# Patient Record
Sex: MALE | Race: WHITE | NOT HISPANIC OR LATINO | URBAN - METROPOLITAN AREA
[De-identification: names, ages, dates, MRNs, and addresses within clinical notes are randomized per-mention and may not be internally consistent; named-entity substitution may affect disease eponyms.]

---

## 2018-08-26 ENCOUNTER — EMERGENCY (EMERGENCY)
Facility: HOSPITAL | Age: 83
LOS: 1 days | Discharge: HOME | End: 2018-08-26
Attending: EMERGENCY MEDICINE
Payer: MEDICARE

## 2018-08-26 VITALS
HEART RATE: 84 BPM | OXYGEN SATURATION: 97 % | RESPIRATION RATE: 18 BRPM | SYSTOLIC BLOOD PRESSURE: 186 MMHG | DIASTOLIC BLOOD PRESSURE: 89 MMHG

## 2018-08-26 LAB
ALBUMIN SERPL ELPH-MCNC: 4.4 G/DL — SIGNIFICANT CHANGE UP (ref 3.3–5)
ALP SERPL-CCNC: 56 U/L — SIGNIFICANT CHANGE UP (ref 40–120)
ALT FLD-CCNC: 18 U/L — SIGNIFICANT CHANGE UP (ref 10–45)
ANION GAP SERPL CALC-SCNC: 16 MMOL/L — SIGNIFICANT CHANGE UP (ref 5–17)
APTT BLD: 32.9 SEC — SIGNIFICANT CHANGE UP (ref 27.5–37.4)
AST SERPL-CCNC: 23 U/L — SIGNIFICANT CHANGE UP (ref 10–40)
BASOPHILS NFR BLD AUTO: 1 % — SIGNIFICANT CHANGE UP (ref 0–2)
BILIRUB SERPL-MCNC: 0.4 MG/DL — SIGNIFICANT CHANGE UP (ref 0.2–1.2)
BLD GP AB SCN SERPL QL: NEGATIVE — SIGNIFICANT CHANGE UP
BUN SERPL-MCNC: 61 MG/DL — HIGH (ref 7–23)
CALCIUM SERPL-MCNC: 9 MG/DL — SIGNIFICANT CHANGE UP (ref 8.4–10.5)
CHLORIDE SERPL-SCNC: 101 MMOL/L — SIGNIFICANT CHANGE UP (ref 96–108)
CO2 SERPL-SCNC: 22 MMOL/L — SIGNIFICANT CHANGE UP (ref 22–31)
CREAT SERPL-MCNC: 2.63 MG/DL — HIGH (ref 0.5–1.3)
EOSINOPHIL # BLD AUTO: 0.2 K/UL — SIGNIFICANT CHANGE UP (ref 0–0.5)
EOSINOPHIL NFR BLD AUTO: 3 % — SIGNIFICANT CHANGE UP (ref 0–6)
GLUCOSE SERPL-MCNC: 93 MG/DL — SIGNIFICANT CHANGE UP (ref 70–99)
HCT VFR BLD CALC: 36 % — LOW (ref 39–50)
HGB BLD-MCNC: 12 G/DL — LOW (ref 13–17)
INR BLD: 1 RATIO — SIGNIFICANT CHANGE UP (ref 0.88–1.16)
LYMPHOCYTES # BLD AUTO: 16 % — SIGNIFICANT CHANGE UP (ref 13–44)
MCHC RBC-ENTMCNC: 33 PG — SIGNIFICANT CHANGE UP (ref 27–34)
MCHC RBC-ENTMCNC: 33.3 GM/DL — SIGNIFICANT CHANGE UP (ref 32–36)
MCV RBC AUTO: 99.2 FL — SIGNIFICANT CHANGE UP (ref 80–100)
MONOCYTES NFR BLD AUTO: 14 % — SIGNIFICANT CHANGE UP (ref 2–14)
NEUTROPHILS NFR BLD AUTO: 63 % — SIGNIFICANT CHANGE UP (ref 43–77)
PLATELET # BLD AUTO: 100 K/UL — LOW (ref 150–400)
POTASSIUM SERPL-MCNC: 5.5 MMOL/L — HIGH (ref 3.5–5.3)
POTASSIUM SERPL-SCNC: 5.5 MMOL/L — HIGH (ref 3.5–5.3)
PROT SERPL-MCNC: 7.1 G/DL — SIGNIFICANT CHANGE UP (ref 6–8.3)
PROTHROM AB SERPL-ACNC: 10.9 SEC — SIGNIFICANT CHANGE UP (ref 9.8–12.7)
RBC # BLD: 3.63 M/UL — LOW (ref 4.2–5.8)
RBC # FLD: 11.9 % — SIGNIFICANT CHANGE UP (ref 10.3–14.5)
RH IG SCN BLD-IMP: NEGATIVE — SIGNIFICANT CHANGE UP
SODIUM SERPL-SCNC: 139 MMOL/L — SIGNIFICANT CHANGE UP (ref 135–145)
WBC # BLD: 4.2 K/UL — SIGNIFICANT CHANGE UP (ref 3.8–10.5)
WBC # FLD AUTO: 4.2 K/UL — SIGNIFICANT CHANGE UP (ref 3.8–10.5)

## 2018-08-26 PROCEDURE — 70450 CT HEAD/BRAIN W/O DYE: CPT | Mod: 26

## 2018-08-26 PROCEDURE — 99284 EMERGENCY DEPT VISIT MOD MDM: CPT | Mod: 25

## 2018-08-26 RX ORDER — SODIUM CHLORIDE 9 MG/ML
1000 INJECTION INTRAMUSCULAR; INTRAVENOUS; SUBCUTANEOUS ONCE
Qty: 0 | Refills: 0 | Status: COMPLETED | OUTPATIENT
Start: 2018-08-26 | End: 2018-08-26

## 2018-08-26 RX ADMIN — SODIUM CHLORIDE 1000 MILLILITER(S): 9 INJECTION INTRAMUSCULAR; INTRAVENOUS; SUBCUTANEOUS at 23:13

## 2018-08-26 NOTE — ED ADULT NURSE NOTE - OBJECTIVE STATEMENT
pt c/o "nose bleed x 2 hrs after tripping on last step of stairs and hitting face onto wood floor. No LOC, no h/a" + active epistasis to bilat nares with clot to rt nare, pain to rt side of nose, + swelling to rt side nose pt c/o "nose bleed x 2 hrs after tripping on last step of stairs and hitting face onto wood floor. No LOC, no h/a" + active epistasis to bilat nares with clot to rt nare, pain to rt side of nose, + swelling to bridge of nose with minimal ecchymosis

## 2018-08-26 NOTE — ED PROVIDER NOTE - CHIEF COMPLAINT
The patient is a 83y Male complaining of The patient is a 83y Male complaining of epistaxis after fall

## 2018-08-26 NOTE — ED PROVIDER NOTE - PMH
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

## 2018-08-26 NOTE — ED PROVIDER NOTE - PROGRESS NOTE DETAILS
Spoke with Radha from ENT who advised me Dr. García is in the ED and will come see patient. Bleeding current controlled with rhino-rocket. - Jose James PA-C Still awaiting ENT consult. Called again and spoke with Radha who thought Dr. García came already but she called him and he is tied up in a procedure but will see the patient. Family and patient made aware. - Jose James PA-C ATTG: : still bleeding and nasal tamponade accidentally came out. replaced tamponade with same rapid rhino. ENT reconsulted as still bleeding and will repeat CBC. ATTG: : ENT at bedside. repeat cbc pending. large clot removed from post pharynx and patient feels better with breathing.  agrees with plan.

## 2018-08-26 NOTE — ED PROVIDER NOTE - OBJECTIVE STATEMENT
82 yo male PMHx afib on eliquis, DVT s/p IVC 40 years prior presents to the ED c/o epistaxis s/p mechanical fall. Patient was walking down steps in his house when his feet got tangled and he feel forward off the last few steps landing on his face. Felt immediate pain in the nose and noted blood. Patient was able to get up on his own and walked to his neighbors house who called EMS. Patient denies preceding dizziness, weakness, chest pain, near-syncope prior to the fall or LOC before/after. Remembers entire incident. Now endorsing profuse bloody nose for the last 1 hour PTA and feels like it's going down his throat. Denies weakness, headache, dizziness, n/v, chest pain, shortness of breath, fatigue, palpitations, abdominal pain, extremity pain/injuries, neck pain, back pain.

## 2018-08-26 NOTE — ED ADULT NURSE NOTE - NSIMPLEMENTINTERV_GEN_ALL_ED
Implemented All Fall Risk Interventions:  [place] to call system. Call bell, personal items and telephone within reach. Instruct patient to call for assistance. Room bathroom lighting operational. Non-slip footwear when patient is off stretcher. Physically safe environment: no spills, clutter or unnecessary equipment. Stretcher in lowest position, wheels locked, appropriate side rails in place. Provide visual cue, wrist band, yellow gown, etc. Monitor gait and stability. Monitor for mental status changes and reorient to person, place, and time. Review medications for side effects contributing to fall risk. Reinforce activity limits and safety measures with patient and family.

## 2018-08-26 NOTE — ED PROVIDER NOTE - NOSE FINDINGS
no rhinorrhea/no inflammation/+Active epistaxis from R nostril with thick clot. No evidence of bleeding or clot from left nostril. No evidence of nasal septal hematoma. Unable to visualize source of bleeding. +tenderness directly over lateral and septal cartilage./CLOTTED NASAL BLOOD/EPISTAXIS

## 2018-08-26 NOTE — ED PROVIDER NOTE - MEDICAL DECISION MAKING DETAILS
ATTG: : + epistaxis on anticoag. check labs, check ct head, inr and type and screen. ivf, re eval for dispo.

## 2018-08-26 NOTE — ED PROVIDER NOTE - ATTENDING CONTRIBUTION TO CARE
82 y/o m with pmhx afib on Eliquis, DVT, IVC, presents for epistaxis. patient tripped and fell earlier this evening. no LOC, no weakness or numbness. able to walk on his own.   Gen.  mild distress from bleeding.   HEENT:  active bleeding from right nostril. pharynx patent. left side no active bleeding. no laceration or abrasion to face.   Lungs:  b/l bs  CVS: S1S2   Abd;  soft non tender  Ext:  no edema no erythema  Neuro: aaox3 no focal deficitis  MSK: 5/5 x 4 ext

## 2018-08-27 VITALS
DIASTOLIC BLOOD PRESSURE: 79 MMHG | HEART RATE: 71 BPM | SYSTOLIC BLOOD PRESSURE: 165 MMHG | RESPIRATION RATE: 18 BRPM | OXYGEN SATURATION: 100 % | TEMPERATURE: 99 F

## 2018-08-27 DIAGNOSIS — R04.0 EPISTAXIS: ICD-10-CM

## 2018-08-27 DIAGNOSIS — S02.2XXA FRACTURE OF NASAL BONES, INITIAL ENCOUNTER FOR CLOSED FRACTURE: ICD-10-CM

## 2018-08-27 LAB
ALBUMIN SERPL ELPH-MCNC: 3.7 G/DL — SIGNIFICANT CHANGE UP (ref 3.3–5)
ALP SERPL-CCNC: 43 U/L — SIGNIFICANT CHANGE UP (ref 40–120)
ALT FLD-CCNC: 16 U/L — SIGNIFICANT CHANGE UP (ref 10–45)
ANION GAP SERPL CALC-SCNC: 10 MMOL/L — SIGNIFICANT CHANGE UP (ref 5–17)
AST SERPL-CCNC: 20 U/L — SIGNIFICANT CHANGE UP (ref 10–40)
BASOPHILS # BLD AUTO: 0 K/UL — SIGNIFICANT CHANGE UP (ref 0–0.2)
BILIRUB SERPL-MCNC: 0.4 MG/DL — SIGNIFICANT CHANGE UP (ref 0.2–1.2)
BLD GP AB SCN SERPL QL: NEGATIVE — SIGNIFICANT CHANGE UP
BUN SERPL-MCNC: 71 MG/DL — HIGH (ref 7–23)
CALCIUM SERPL-MCNC: 8.3 MG/DL — LOW (ref 8.4–10.5)
CHLORIDE SERPL-SCNC: 108 MMOL/L — SIGNIFICANT CHANGE UP (ref 96–108)
CO2 SERPL-SCNC: 22 MMOL/L — SIGNIFICANT CHANGE UP (ref 22–31)
CREAT SERPL-MCNC: 2.19 MG/DL — HIGH (ref 0.5–1.3)
EOSINOPHIL # BLD AUTO: 0 K/UL — SIGNIFICANT CHANGE UP (ref 0–0.5)
GLUCOSE SERPL-MCNC: 108 MG/DL — HIGH (ref 70–99)
HCT VFR BLD CALC: 29.2 % — LOW (ref 39–50)
HCT VFR BLD CALC: 30.2 % — LOW (ref 39–50)
HCT VFR BLD CALC: 33.2 % — LOW (ref 39–50)
HGB BLD-MCNC: 10.2 G/DL — LOW (ref 13–17)
HGB BLD-MCNC: 10.9 G/DL — LOW (ref 13–17)
HGB BLD-MCNC: 9.9 G/DL — LOW (ref 13–17)
LYMPHOCYTES # BLD AUTO: 0.8 K/UL — LOW (ref 1–3.3)
LYMPHOCYTES # BLD AUTO: 14 % — SIGNIFICANT CHANGE UP (ref 13–44)
MCHC RBC-ENTMCNC: 32.7 PG — SIGNIFICANT CHANGE UP (ref 27–34)
MCHC RBC-ENTMCNC: 32.9 GM/DL — SIGNIFICANT CHANGE UP (ref 32–36)
MCHC RBC-ENTMCNC: 33.4 PG — SIGNIFICANT CHANGE UP (ref 27–34)
MCHC RBC-ENTMCNC: 33.5 PG — SIGNIFICANT CHANGE UP (ref 27–34)
MCHC RBC-ENTMCNC: 33.6 GM/DL — SIGNIFICANT CHANGE UP (ref 32–36)
MCHC RBC-ENTMCNC: 33.9 GM/DL — SIGNIFICANT CHANGE UP (ref 32–36)
MCV RBC AUTO: 98.7 FL — SIGNIFICANT CHANGE UP (ref 80–100)
MCV RBC AUTO: 99.3 FL — SIGNIFICANT CHANGE UP (ref 80–100)
MCV RBC AUTO: 99.3 FL — SIGNIFICANT CHANGE UP (ref 80–100)
MONOCYTES # BLD AUTO: 1 K/UL — HIGH (ref 0–0.9)
MONOCYTES NFR BLD AUTO: 18 % — HIGH (ref 2–14)
NEUTROPHILS # BLD AUTO: 3.5 K/UL — SIGNIFICANT CHANGE UP (ref 1.8–7.4)
NEUTROPHILS NFR BLD AUTO: 68 % — SIGNIFICANT CHANGE UP (ref 43–77)
PLAT MORPH BLD: NORMAL — SIGNIFICANT CHANGE UP
PLATELET # BLD AUTO: 90 K/UL — LOW (ref 150–400)
PLATELET # BLD AUTO: 96 K/UL — LOW (ref 150–400)
PLATELET # BLD AUTO: 96 K/UL — LOW (ref 150–400)
POTASSIUM SERPL-MCNC: 5.1 MMOL/L — SIGNIFICANT CHANGE UP (ref 3.5–5.3)
POTASSIUM SERPL-SCNC: 5.1 MMOL/L — SIGNIFICANT CHANGE UP (ref 3.5–5.3)
PROT SERPL-MCNC: 5.6 G/DL — LOW (ref 6–8.3)
RBC # BLD: 2.96 M/UL — LOW (ref 4.2–5.8)
RBC # BLD: 3.04 M/UL — LOW (ref 4.2–5.8)
RBC # BLD: 3.34 M/UL — LOW (ref 4.2–5.8)
RBC # FLD: 11.5 % — SIGNIFICANT CHANGE UP (ref 10.3–14.5)
RBC # FLD: 11.6 % — SIGNIFICANT CHANGE UP (ref 10.3–14.5)
RBC # FLD: 11.8 % — SIGNIFICANT CHANGE UP (ref 10.3–14.5)
RBC BLD AUTO: SIGNIFICANT CHANGE UP
RH IG SCN BLD-IMP: NEGATIVE — SIGNIFICANT CHANGE UP
SODIUM SERPL-SCNC: 140 MMOL/L — SIGNIFICANT CHANGE UP (ref 135–145)
WBC # BLD: 5.2 K/UL — SIGNIFICANT CHANGE UP (ref 3.8–10.5)
WBC # BLD: 5.3 K/UL — SIGNIFICANT CHANGE UP (ref 3.8–10.5)
WBC # BLD: 5.5 K/UL — SIGNIFICANT CHANGE UP (ref 3.8–10.5)
WBC # FLD AUTO: 5.2 K/UL — SIGNIFICANT CHANGE UP (ref 3.8–10.5)
WBC # FLD AUTO: 5.3 K/UL — SIGNIFICANT CHANGE UP (ref 3.8–10.5)
WBC # FLD AUTO: 5.5 K/UL — SIGNIFICANT CHANGE UP (ref 3.8–10.5)

## 2018-08-27 PROCEDURE — 96361 HYDRATE IV INFUSION ADD-ON: CPT

## 2018-08-27 PROCEDURE — 86900 BLOOD TYPING SEROLOGIC ABO: CPT

## 2018-08-27 PROCEDURE — 85610 PROTHROMBIN TIME: CPT

## 2018-08-27 PROCEDURE — 99284 EMERGENCY DEPT VISIT MOD MDM: CPT | Mod: 25

## 2018-08-27 PROCEDURE — 70450 CT HEAD/BRAIN W/O DYE: CPT

## 2018-08-27 PROCEDURE — 99218: CPT

## 2018-08-27 PROCEDURE — 30903 CONTROL OF NOSEBLEED: CPT | Mod: RT

## 2018-08-27 PROCEDURE — 96374 THER/PROPH/DIAG INJ IV PUSH: CPT | Mod: XU

## 2018-08-27 PROCEDURE — 80053 COMPREHEN METABOLIC PANEL: CPT

## 2018-08-27 PROCEDURE — G0378: CPT

## 2018-08-27 PROCEDURE — 85027 COMPLETE CBC AUTOMATED: CPT

## 2018-08-27 PROCEDURE — 86850 RBC ANTIBODY SCREEN: CPT

## 2018-08-27 PROCEDURE — 86901 BLOOD TYPING SEROLOGIC RH(D): CPT

## 2018-08-27 PROCEDURE — 85730 THROMBOPLASTIN TIME PARTIAL: CPT

## 2018-08-27 RX ORDER — AMPICILLIN SODIUM AND SULBACTAM SODIUM 250; 125 MG/ML; MG/ML
3 INJECTION, POWDER, FOR SUSPENSION INTRAMUSCULAR; INTRAVENOUS ONCE
Qty: 0 | Refills: 0 | Status: COMPLETED | OUTPATIENT
Start: 2018-08-27 | End: 2018-08-27

## 2018-08-27 RX ORDER — AMPICILLIN SODIUM AND SULBACTAM SODIUM 250; 125 MG/ML; MG/ML
3 INJECTION, POWDER, FOR SUSPENSION INTRAMUSCULAR; INTRAVENOUS EVERY 12 HOURS
Qty: 0 | Refills: 0 | Status: DISCONTINUED | OUTPATIENT
Start: 2018-08-27 | End: 2018-08-30

## 2018-08-27 RX ORDER — SODIUM CHLORIDE 9 MG/ML
1000 INJECTION INTRAMUSCULAR; INTRAVENOUS; SUBCUTANEOUS
Qty: 0 | Refills: 0 | Status: DISCONTINUED | OUTPATIENT
Start: 2018-08-27 | End: 2018-08-30

## 2018-08-27 RX ORDER — SODIUM CHLORIDE 9 MG/ML
3 INJECTION INTRAMUSCULAR; INTRAVENOUS; SUBCUTANEOUS EVERY 8 HOURS
Qty: 0 | Refills: 0 | Status: DISCONTINUED | OUTPATIENT
Start: 2018-08-27 | End: 2018-08-30

## 2018-08-27 RX ORDER — AMPICILLIN SODIUM AND SULBACTAM SODIUM 250; 125 MG/ML; MG/ML
INJECTION, POWDER, FOR SUSPENSION INTRAMUSCULAR; INTRAVENOUS
Qty: 0 | Refills: 0 | Status: DISCONTINUED | OUTPATIENT
Start: 2018-08-27 | End: 2018-08-30

## 2018-08-27 RX ADMIN — SODIUM CHLORIDE 3 MILLILITER(S): 9 INJECTION INTRAMUSCULAR; INTRAVENOUS; SUBCUTANEOUS at 05:23

## 2018-08-27 RX ADMIN — AMPICILLIN SODIUM AND SULBACTAM SODIUM 200 GRAM(S): 250; 125 INJECTION, POWDER, FOR SUSPENSION INTRAMUSCULAR; INTRAVENOUS at 02:11

## 2018-08-27 RX ADMIN — SODIUM CHLORIDE 1000 MILLILITER(S): 9 INJECTION INTRAMUSCULAR; INTRAVENOUS; SUBCUTANEOUS at 00:08

## 2018-08-27 RX ADMIN — SODIUM CHLORIDE 200 MILLILITER(S): 9 INJECTION INTRAMUSCULAR; INTRAVENOUS; SUBCUTANEOUS at 02:11

## 2018-08-27 NOTE — CONSULT NOTE ADULT - PROBLEM SELECTOR RECOMMENDATION 9
Would consider monitoring pt in CDU, considering moderate amount of bleeding  Continue with Nasal Packing   Continue ABX for nasal packing prophylaxis  Monitor vitals, BP control   Nasal saline, 2 sprays to both nares 4 times a day/Bacitracin Ointment B/L 2x/day  Avoid nasal trauma; no nose rubbing, blowing or manipulating nasal packing.  Cough and sneeze with mouth open, avoid straining, or heavy lifting.  Trend CBC, Monitor H/H   Pain control prn

## 2018-08-27 NOTE — ED CDU PROVIDER DISPOSITION NOTE - PLAN OF CARE
1. keep nasal packing in until removed by ENT  2. avoid heavy lifting, exertion, cough/sneeze with mouth open.  3. can take tylenol 650mg every 6 hours as needed for pain.   4. return to ED if you have new/worsening/concerning symptoms, have increased bleeding, have dizziness, unsteady gait. 1. keep nasal packing in until removed by ENT, phone number [phone]  2. Nasal saline, 2 sprays to both nares 4 times a day/Bacitracin Ointment B/L 2x/day  Avoid nasal trauma; no nose rubbing, blowing or manipulating nasal packing.  Cough and sneeze with mouth open, avoid straining, or heavy lifting.  3. can take tylenol 650mg every 6 hours as needed for pain.   4. return to ED if you have new/worsening/concerning symptoms, have increased bleeding, have dizziness, unsteady gait.

## 2018-08-27 NOTE — ED CDU PROVIDER INITIAL DAY NOTE - NOSE FINDINGS
CLOTTED NASAL BLOOD/+Active epistaxis from R nostril with thick clot. No evidence of bleeding or clot from left nostril. No evidence of nasal septal hematoma. Unable to visualize source of bleeding. +tenderness directly over lateral and septal cartilage./no inflammation/no rhinorrhea/EPISTAXIS no active epistaxis from R nostril with thick clot. No evidence of bleeding or clot from left nostril. No evidence of nasal septal hematoma. Unable to visualize source of bleeding. +tenderness directly over lateral and septal cartilage./no inflammation/CLOTTED NASAL BLOOD/no rhinorrhea/EPISTAXIS

## 2018-08-27 NOTE — ED ADULT NURSE REASSESSMENT NOTE - COMFORT CARE
warm blanket provided/ambulated to bathroom/plan of care explained/po fluids offered
plan of care explained/for CDU
side rails up/plan of care explained

## 2018-08-27 NOTE — ED ADULT NURSE REASSESSMENT NOTE - CONDITION
no active bleeding at present. nasal tampon to rt nare intact and left nare with packing noted/improved

## 2018-08-27 NOTE — CONSULT NOTE ADULT - ASSESSMENT
83yoM s/p mechanical fall face forward sustaining osseous nasal septal fracture ( seen on CT) with B/L epistaxis R>L, controlled with nasal Packing H/H Stable

## 2018-08-27 NOTE — ED ADULT NURSE REASSESSMENT NOTE - NS ED NURSE REASSESS COMMENT FT1
14.30 Pt was Reviewed by Dr Nagi SIMON MD. Epistaxis under control. pt had re eval from ENT team   Pt is Discharged  ML out DOMINICK Ferrer explained the  Follow up care & gave the Discharge Summary Pt verbalized the understanding on follow up care Pt has stable vitals steady gait A&OX4  Stable to go home
pt c/o bleeding starting again down throat. minimal oozing from rt nare- Dr Daley aware and at bedside. Waiting ENT
07.00 Am Received Report from SHELIA Salinas/Minal Gonzales  07.30 Am Pt reassessed Pt A&OX4 ambulatory has steady gait  denies N /V/D fever chills CP sob pain has stable vitals afebrile here IV site looks clean dry intact no signs of infiltration noted  Comfort care & safety measures continued  Pt got reevaluated by ENT Nasal dressing changed  pt still has dribbling of scanty blood from the nose continue to monitor Has no respiratory distress  Pt is awaiting for CDU eval & Decision
Pt received from SHELIA Salinas. Pt oriented to CDU & plan of care was discussed. Pt has nasal edema with tampon in right nostril and packing in left nostril. No active bleeding noted. Pt c/o some discomfort to nasal area 4/10. Pt denies difficulty breathing or bleeding to nose. Safety & comfort measures maintained. Call bell in reach. Will continue to monitor.

## 2018-08-27 NOTE — ED CDU PROVIDER INITIAL DAY NOTE - OBJECTIVE STATEMENT
82 yo male PMHx afib on eliquis, DVT s/p IVC 40 years prior presents to the ED c/o epistaxis s/p mechanical fall. Patient was walking down steps in his house when his feet got tangled and he feel forward off the last few steps landing on his face. Felt immediate pain in the nose and noted blood. Patient was able to get up on his own and walked to his neighbors house who called EMS. Patient denies preceding dizziness, weakness, chest pain, near-syncope prior to the fall or LOC before/after. Remembers entire incident. Now endorsing profuse bloody nose for the last 1 hour PTA and feels like it's going down his throat. Denies weakness, headache, dizziness, n/v, chest pain, shortness of breath, fatigue, palpitations, abdominal pain, extremity pain/injuries, neck pain, back pain. 82 yo male PMHx afib on eliquis, DVT s/p IVC 40 years prior presents to the ED c/o epistaxis s/p mechanical fall. Patient was walking down steps in his house when his feet got tangled and he feel forward off the last few steps landing on his face. Felt immediate pain in the nose and noted blood. Patient was able to get up on his own and walked to his neighbors house who called EMS. Patient denies preceding dizziness, weakness, chest pain, near-syncope prior to the fall or LOC before/after. Remembers entire incident. Now endorsing profuse bloody nose for the last 1 hour PTA and feels like it's going down his throat. Denies weakness, headache, dizziness, n/v, chest pain, shortness of breath, fatigue, palpitations, abdominal pain, extremity pain/injuries, neck pain, back pain.    In the ED, patient had Head CT w/o contrast showed Stable intracranial findings. No evidence of intracranial hemorrhage or   displaced calvarial fracture. New fracture of the osseous nasal septum with hemorrhagic air-fluid levels in the maxillary sinuses and fluid within the nasal cavity and nasopharynx. Pt had was evaluated by DOMINICK Garcia of ENT w/ Dr. García made aware. Rhino rocket placed. ENT recommended IV antibiotics, repeat CBC testing and airway monitoring in CDU. 84 yo male PMHx afib on eliquis, DVT s/p IVC 40 years prior presents to the ED c/o epistaxis s/p mechanical fall. Patient was walking down steps in his house when his feet got tangled and he feel forward off the last few steps landing on his face. Felt immediate pain in the nose and noted blood. Patient was able to get up on his own and walked to his neighbors house who called EMS. Patient denies preceding dizziness, weakness, chest pain, near-syncope prior to the fall or LOC before/after. Remembers entire incident. Now endorsing profuse bloody nose for the last 1 hour PTA and feels like it's going down his throat. Denies weakness, headache, dizziness, n/v, chest pain, shortness of breath, fatigue, palpitations, abdominal pain, extremity pain/injuries, neck pain, back pain.    In the ED, patient had Head CT w/o contrast showed Stable intracranial findings. No evidence of intracranial hemorrhage or displaced calvarial fracture. New fracture of the osseous nasal septum with hemorrhagic air-fluid levels in the maxillary sinuses and fluid within the nasal cavity and nasopharynx. Pt had was evaluated by DOMINICK Garcia of ENT w/ Dr. García made aware. Rhino rocket placed. ENT recommended IV antibiotics, repeat CBC testing and airway monitoring in CDU.

## 2018-08-27 NOTE — CONSULT NOTE ADULT - SUBJECTIVE AND OBJECTIVE BOX
CC: Epistaxis    HPI: 84 yo male PMHx afib on eliquis, DVT s/p IVC 40 years prior presents to the ED c/o epistaxis s/p mechanical fall. Patient was walking down steps in his house when his feet got tangled and he feel forward off the last few steps landing on his face. Felt immediate pain in the nose and noted blood. Patient was able to get up on his own and walked to his neighbors house who called EMS. Patient denies preceding dizziness, weakness, chest pain, near-syncope prior to the fall or LOC before/after. Remembers entire incident. Now endorsing profuse bloody nose R>L for the last 1 hour PTA and feels like it's going down his throat. Denies weakness, headache, dizziness, n/v, chest pain, shortness of breath, fatigue, palpitations, abdominal pain, extremity pain/injuries, neck pain, back pain.    In the ED, patient had Head CT w/o contrast showed Stable intracranial findings. No evidence of intracranial hemorrhage or   displaced calvarial fracture. New fracture of the osseous nasal septum with hemorrhagic air-fluid levels in the maxillary sinuses and fluid within the nasal cavity and nasopharynx. Pt packed by with Right Rapid Rhino for control of bleeding, however pt continues to bleed and ooze through packing      INTERPRETATION: HISTORY: Status post fall.   Technique: CT of the head was performed.   Multiple contiguous axial images were acquired from the skullbase to the   vertex without the administration of intravenous contrast. Coronal and   sagittal reformations were made.   COMPARISON: Prior head CT dated 6/22/2015.     FINDINGS:   There is no acute hemorrhage, mass effect from vasogenic edema or evidence   of large vascular territory infarct. There are stable mild chronic   microvascular changes.   The ventricles, sulci and cisternal spaces are stable in size and   configuration and unremarkable for age. There is no midline shift or   abnormal extra-axial fluid collection.     The calvarium is intact. There is new irregularity of the osseous nasal   septum with mottled fluid in the nasal cavity and nasopharynx. Their are   small hemorrhagic air-fluid levels in the maxillary sinuses, right greater   than left, as well as within the sphenoid sinuses. There is no evidence of   an nasal septal hematoma. There is no displaced nasal bone fracture.   Visualized intraorbital compartments are unremarkable.     IMPRESSION:   Stable intracranial findings. No evidence of intracranial hemorrhage or   displaced calvarial fracture.   New fracture of the osseous nasal septum with hemorrhagic air-fluid levels   in the maxillary sinuses and fluid within the nasal cavity and nasopharynx.         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 1000 milliLiter(s) (200 mL/Hr) IV Continuous <Continuous>    MEDICATIONS  (PRN):      Social History: denies etoh/tobacco/substance use    Family history: Non contributory    ROS:   ENT: all negative except as noted in HPI   CV: denies palpitations  Pulm: denies SOB, cough, hemoptysis  GI: denies change in apetite, indigestion, n/v  : denies pertinent urinary symptoms, urgency  Neuro: denies numbness/tingling, loss of sensation  Psych: denies anxiety  MS: denies muscle weakness, instability  Heme: denies easy bruising or bleeding  Endo: denies heat/cold intolerance, excessive sweating  Vascular: denies LE edema    Vital Signs Last 24 Hrs  T(C): 37.1 (27 Aug 2018 04:20), Max: 37.1 (26 Aug 2018 23:02)  T(F): 98.7 (27 Aug 2018 04:20), Max: 98.8 (27 Aug 2018 02:10)  HR: 77 (27 Aug 2018 04:20) (73 - 88)  BP: 178/88 (27 Aug 2018 04:20) (169/77 - 194/100)  RR: 18 (27 Aug 2018 04:20) (18 - 18)  SpO2: 98% (27 Aug 2018 04:20) (96% - 99%)                          10.9   5.3   )-----------( 96       ( 27 Aug 2018 00:54 )             33.2    08-26    139  |  101  |  61<H>  ----------------------------<  93  5.5<H>   |  22  |  2.63<H>    Ca    9.0      26 Aug 2018 21:18    TPro  7.1  /  Alb  4.4  /  TBili  0.4  /  DBili  x   /  AST  23  /  ALT  18  /  AlkPhos  56  08-26   PT/INR - ( 26 Aug 2018 21:18 )   PT: 10.9 sec;   INR: 1.00 ratio         PTT - ( 26 Aug 2018 21:18 )  PTT:32.9 sec    PHYSICAL EXAM:  Gen: NAD  Skin: No rashes, bruises, or lesions  Head: Normocephalic, Atraumatic  Face: no edema, erythema, or fluctuance. Parotid glands soft without mass  Eyes: no scleral injection  Ears: Right - ear canal clear, TM intact without effusion or erythema. No evidence of any fluid drainage. No mastoid tenderness, erythema, or ear bulging            Left - ear canal clear, TM intact without effusion or erythema. No evidence of any fluid drainage. No mastoid tenderness, erythema, or ear bulging  Nose:  R Nares packed with Rapid Rhino with continual bleeding, Left Nare patent with mininmal oozing  Right Rapid Rhino removed, suctioned Right nostril with persistent bleeding packed with Rapid Rhino ( 7.5) inflated with Saline ~5ml, packed anteriorly with Surgicel for control of bleeding. Left Nare packed with Surgicel  Mouth: No Stridor / Drooling / Trismus.  Mucosa moist, tongue/uvula midline, +blood in oropharynx Large Clot suctioned from Oropharynx  Neck: Flat, supple, no lymphadenopathy, trachea midline, no masses  Lymphatic: No lymphadenopathy  Resp: breathing easily, no stridor  CV: no peripheral edema/cyanosis  GI: nondistended   Peripheral vascular: no JVD or edema  Neuro: facial nerve intact, no facial droop

## 2018-08-27 NOTE — PROGRESS NOTE ADULT - PROBLEM SELECTOR PLAN 1
Continue with Nasal Packing   Continue ABX for nasal packing prophylaxis  Monitor vitals, BP control   Nasal saline, 2 sprays to both nares 4 times a day/Bacitracin Ointment B/L 2x/day  Avoid nasal trauma; no nose rubbing, blowing or manipulating nasal packing.  Cough and sneeze with mouth open, avoid straining, or heavy lifting.  Trend CBC, Monitor H/H   Pain control prn.

## 2018-08-27 NOTE — ED CDU PROVIDER INITIAL DAY NOTE - PROGRESS NOTE DETAILS
CDU PROGRESS NOTE PA ERIC: Pt c/o coughing blood and bleeding from back of mouth. Pt noted to have clotted blood posterior to uvula w/ mild bleeding from Rhino rocket. Airway intact, no signs of respiratory distress. ENT contacted, will see patient. Patient evaluated by ENT, recommend Continue with Nasal Packing, Continue ABX for nasal packing prophylaxis, Monitor vitals, BP control, Trend CBC, Monitor H/H , Pain control prn. Will re-evaluate Pt resting comfortably. NAD. No complaints. VSS. No current bleeding, pt eager for d/c. discussed need to trend CBC, if hemoglobin stable or improved at 1pm will send home, if dropping will need admission he notes understanding. case d/w Dr. Lazar Pt resting comfortably. NAD. No complaints. VSS. no recurrent bleeding, pending CBC results. Pt resting comfortably. NAD. No complaints. VSS. CBC at 1pm improved hemoglobin from 9.9 to 10.2, discussed d/c home with ENT follow up and augmentin 875/125 Q 12 hours until seen by ENT for packing removal in 2-3 days. He notes understanding, case d/w Dr. Lazar.

## 2018-08-27 NOTE — PROGRESS NOTE ADULT - ASSESSMENT
83yoM s/p mechanical fall face forward sustaining osseous nasal septal fracture (seen on CT) with B/L epistaxis R>L, controlled with nasal Packing. H/H Stable.

## 2018-08-27 NOTE — ED CDU PROVIDER DISPOSITION NOTE - ATTENDING CONTRIBUTION TO CARE
84 yo male sp mechanical trip and fall found to have facial fracture with epistaxis, on Eliquis, seen by ENT with nasal packing in place, placed in CDU for observation and serial CBCs, initially with drop in CBC 12 - 10.9 - 9.3, however no longer with active bleeding. Pt denies any complaints this morning. No ab pain, no back pain, no weakness, no extremity swelling or pain. PE: Nasal packing in place, lungs CTA b/l, rrr, ab soft nt/nd, no CVA tenderness, no spinal tenderness. Seen this morning by ENT, repeated CBC at 1pm, stable and has been cleared for discharged. Pt eager to go home. Will follow up with ENT in 2 days for packing removal. Pt sent home on ppx abx for nasal packing. VS.

## 2018-08-27 NOTE — ED CDU PROVIDER DISPOSITION NOTE - CLINICAL COURSE
82 yo male PMHx afib on eliquis, DVT s/p IVC 40 years prior presents to the ED c/o epistaxis s/p mechanical fall. Patient was walking down steps in his house when his feet got tangled and he feel forward off the last few steps landing on his face. Felt immediate pain in the nose and noted blood. Patient was able to get up on his own and walked to his neighbors house who called EMS. Patient denies preceding dizziness, weakness, chest pain, near-syncope prior to the fall or LOC before/after. Remembers entire incident. Now endorsing profuse bloody nose for the last 1 hour PTA and feels like it's going down his throat. Denies weakness, headache, dizziness, n/v, chest pain, shortness of breath, fatigue, palpitations, abdominal pain, extremity pain/injuries, neck pain, back pain.    In the ED, patient had Head CT w/o contrast showed Stable intracranial findings. No evidence of intracranial hemorrhage or   displaced calvarial fracture. New fracture of the osseous nasal septum with hemorrhagic air-fluid levels in the maxillary sinuses and fluid within the nasal cavity and nasopharynx. Pt had was evaluated by DOMINICK Garcia of ENT w/ Dr. García made aware. Rhino rocket placed. ENT recommended IV antibiotics, repeat CBC testing and airway monitoring in CDU. 84 yo male PMHx afib on eliquis, DVT s/p IVC 40 years prior presents to the ED c/o epistaxis s/p mechanical fall. Patient was walking down steps in his house when his feet got tangled and he feel forward off the last few steps landing on his face. Felt immediate pain in the nose and noted blood. Patient was able to get up on his own and walked to his neighbors house who called EMS. Patient denies preceding dizziness, weakness, chest pain, near-syncope prior to the fall or LOC before/after. Remembers entire incident. Now endorsing profuse bloody nose for the last 1 hour PTA and feels like it's going down his throat. Denies weakness, headache, dizziness, n/v, chest pain, shortness of breath, fatigue, palpitations, abdominal pain, extremity pain/injuries, neck pain, back pain.    In the ED, patient had Head CT w/o contrast showed Stable intracranial findings. No evidence of intracranial hemorrhage or   displaced calvarial fracture. New fracture of the osseous nasal septum with hemorrhagic air-fluid levels in the maxillary sinuses and fluid within the nasal cavity and nasopharynx. Pt had was evaluated by DOMINICK Garcia of ENT w/ Dr. García made aware. Rhino rocket placed. ENT recommended IV antibiotics, repeat CBC testing and airway monitoring in CDU.  Pt resting comfortably. NAD. No complaints. VSS. CBC at 1pm improved hemoglobin from 9.9 to 10.2, discussed d/c home with ENT follow up and augmentin 875/125 Q 12 hours until seen by ENT for packing removal in 2-3 days. He notes understanding, case d/w Dr. Lazar.

## 2018-08-27 NOTE — PROGRESS NOTE ADULT - SUBJECTIVE AND OBJECTIVE BOX
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 Patient was walking down steps in his house when his feet got tangled and he feel forward off the last few steps landing on his face. Felt immediate pain in the nose and noted blood. Patient was able to get up on his own and walked to his neighbors house who called EMS. Patient denies preceding dizziness, weakness, chest pain, near-syncope prior to the fall or LOC before/after. Remembers entire incident. Endorses profuse bloody nose R>L for 1 hour PTA and feels like it's going down his throat. Denies weakness, headache, dizziness, n/v, chest pain, shortness of breath, fatigue, palpitations, abdominal pain, extremity pain/injuries, neck pain, back pain.    In the ED, patient had Head CT w/o contrast showed Stable intracranial findings. No evidence of intracranial hemorrhage or   displaced calvarial fracture. New fracture of the osseous nasal septum with hemorrhagic air-fluid levels in the maxillary sinuses and fluid within the nasal cavity and nasopharynx. Pt packed by with Right Rapid Rhino and surgicel in left nare for control of bleeding.      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 1000 milliLiter(s) (200 mL/Hr) IV Continuous <Continuous>    MEDICATIONS  (PRN):      Social History: See consult note.    Family history: See consult note.    ROS:   ENT: all negative except as noted in HPI   Pulm: denies SOB, cough, hemoptysis  Neuro: denies numbness/tingling, loss of sensation  Endo: denies heat/cold intolerance, excessive sweating      Vital Signs Last 24 Hrs  T(C): 36.5 (27 Aug 2018 07:44), Max: 37.1 (26 Aug 2018 23:02)  T(F): 97.7 (27 Aug 2018 07:44), Max: 98.8 (27 Aug 2018 02:10)  HR: 73 (27 Aug 2018 07:44) (73 - 88)  BP: 189/78 (27 Aug 2018 07:44) (169/77 - 194/100)  BP(mean): --  RR: 18 (27 Aug 2018 07:44) (18 - 18)  SpO2: 98% (27 Aug 2018 07:44) (96% - 99%)                          9.9    5.5   )-----------( 90       ( 27 Aug 2018 06:23 )             29.2    08-27    140  |  108  |  71<H>  ----------------------------<  108<H>  5.1   |  22  |  2.19<H>    Ca    8.3<L>      27 Aug 2018 06:23    TPro  5.6<L>  /  Alb  3.7  /  TBili  0.4  /  DBili  x   /  AST  20  /  ALT  16  /  AlkPhos  43  08-27   PT/INR - ( 26 Aug 2018 21:18 )   PT: 10.9 sec;   INR: 1.00 ratio         PTT - ( 26 Aug 2018 21:18 )  PTT:32.9 sec    PHYSICAL EXAM:  Gen: NAD  Skin: No rashes, bruises, or lesions  Head: Normocephalic, Atraumatic  Face: no edema, erythema, or fluctuance. Parotid glands soft without mass  Eyes: no scleral injection  Nose: Packed with Right Rapid Rhino and surgicel placed in left nare. No active bleeding. Moustache dressing in place.   Mouth: No Stridor / Drooling / Trismus.  Mucosa moist, tongue/uvula midline, large clot removed from oropharynx. No active bleeding.  Neck: Flat, supple, no lymphadenopathy, trachea midline, no masses  Lymphatic: No lymphadenopathy  Resp: No stridor  Neuro: facial nerve intact, no facial droop
